# Patient Record
Sex: FEMALE | Race: WHITE | Employment: PART TIME | URBAN - METROPOLITAN AREA
[De-identification: names, ages, dates, MRNs, and addresses within clinical notes are randomized per-mention and may not be internally consistent; named-entity substitution may affect disease eponyms.]

---

## 2019-11-29 ENCOUNTER — APPOINTMENT (OUTPATIENT)
Dept: GENERAL RADIOLOGY | Facility: CLINIC | Age: 34
End: 2019-11-29
Attending: EMERGENCY MEDICINE
Payer: COMMERCIAL

## 2019-11-29 ENCOUNTER — HOSPITAL ENCOUNTER (EMERGENCY)
Facility: CLINIC | Age: 34
Discharge: HOME | End: 2019-11-29
Attending: EMERGENCY MEDICINE | Admitting: EMERGENCY MEDICINE
Payer: COMMERCIAL

## 2019-11-29 VITALS
SYSTOLIC BLOOD PRESSURE: 119 MMHG | TEMPERATURE: 98 F | OXYGEN SATURATION: 99 % | RESPIRATION RATE: 18 BRPM | DIASTOLIC BLOOD PRESSURE: 63 MMHG | HEART RATE: 80 BPM

## 2019-11-29 DIAGNOSIS — R05.9 COUGH: ICD-10-CM

## 2019-11-29 DIAGNOSIS — R07.89 CHEST WALL PAIN: ICD-10-CM

## 2019-11-29 PROCEDURE — 99284 EMERGENCY DEPT VISIT MOD MDM: CPT | Mod: 25

## 2019-11-29 PROCEDURE — 71046 X-RAY EXAM CHEST 2 VIEWS: CPT

## 2019-11-29 RX ORDER — GUAIFENESIN 200 MG/10ML
200 LIQUID ORAL EVERY 4 HOURS PRN
Qty: 118 ML | Refills: 0 | Status: SHIPPED | OUTPATIENT
Start: 2019-11-29

## 2019-11-29 ASSESSMENT — ENCOUNTER SYMPTOMS
COUGH: 1
FEVER: 1
SORE THROAT: 1

## 2019-11-29 NOTE — ED PROVIDER NOTES
History     Chief Complaint:    Cough and Chest Wall Pain      HPI   Mirna Horne is a 34 year old female who presents to the ED for evaluation of cough and chest pain. The patient states that she developed a fever four days ago. Yesterday, her fever persisted, though was improving, however, she also developed a cough. She states that she has throat pain that radiates to her chest when she coughs as well. This morning, she states that her fever had resolved but her cough and chest pain persisted which prompted her visti to the ED. She notes that her kids have been sick with influenza.     Allergies:  The patient has no known drug allergies.    Medications:    The patient is currently on no regular medications.    Past Medical History:    ASCUS of cervix with negative HPV    Past Surgical History:    Cryotherapy of cervix    Family History:    No past pertinent family history.    Social History:  Negative for tobacco use.  Negative for alcohol use.  Presents with significant other.   Children are sick.     Review of Systems   Constitutional: Positive for fever.   HENT: Positive for sore throat.    Respiratory: Positive for cough.    All other systems reviewed and are negative.    Physical Exam   First Vitals:  BP: 127/77  Pulse: 87  Temp: 97.4  F (36.3  C)  Resp: 16  SpO2: 100 %      Physical Exam  General: Patient is alert and interactive when I enter the room  Head:  The scalp, face, and head appear normal  Eyes:  Conjunctivae are normal  ENT:    The nose is normal    Pinnae are normal    External acoustic canals are normal  Neck:  Trachea midline  CV:  Pulses are normal,RRR.    Resp:  No respiratory distress, CTAB, no wheezing     Abdomen:      Soft, non-tender, non-distended  Musc:  Normal muscular tone    No major joint effusions    No asymmetric leg swelling  Skin:  No rash or lesions noted  Neuro:  Speech is normal and fluent. Face is symmetric.     Moving all extremities well.   Psych: Awake. Alert.   Normal affect.  Appropriate interactions.  Emergency Department Course   Imaging:  Radiographic findings were communicated with the patient who voiced understanding of the findings.  XR Chest 2 views:   IMPRESSION: Heart size is normal. No pleural effusion, pneumothorax,  or abnormal area of consolidation as per radiology.    Emergency Department Course:  Nursing notes and vitals reviewed. (0715) I performed an exam of the patient as documented above.     The patient was sent for a chest x-ray while in the emergency department, findings above.     0750 I rechecked the patient and discussed the results of her workup thus far.     Findings and plan explained to the Patient. Patient discharged home with instructions regarding supportive care, medications, and reasons to return. The importance of close follow-up was reviewed. The patient was prescribed Robitussin.     Impression & Plan    Medical Decision Making:  Mirna Horne is a 34 year old female who presented to the ER for evaluation of a cough.  VS on presentation reveal normal vitals.  Differential diagnosis includes, though is not limited to, acute bronchitis, pneumonia, sinusitis, pharyngitis, reactive airway disease (asthma/COPD), CHF, GERD, influenza, pertussis, aspiration, occupational exposure, medication side effect, among others.  History, examination, and ED course are most consistent with bronchitis.  Associated symptoms with her current illness include chest wall pain.  CXR is negative for infiltrate suggesting pneumonia.  Pulmonary exam does not reveal wheezing, prolonged expiratory phase, nor evidence of increased work of breathing suggestive of reactive airway disease.  No history of aspiration.   She is not immunocompromised which would complicate their current clinical course.  Clinical impression was discussed with the patient.  Discussed that initial symptoms may improve during the first 3-5 days, although the cough may persist for 1-2 weeks.   Symptomatic cares including rest, fluids, antipyretics, analgesia, and a trial of cough suppressant medication was recommended.   Pt was recommended to return to the ER with shortness of breath, development of chest pain, become unable to tolerate PO, develop any other new or troubling symptoms.  Otherwise, patient is recommended to follow-up with primary care provider in 1 week for re-evaluation.  All questions were answered prior to discharge.  She felt comfortable with this plan of care.      Diagnosis:    ICD-10-CM    1. Cough R05    2. Chest wall pain R07.89        Disposition:  discharged to home    Discharge Medications:  New Prescriptions    GUAIFENESIN (ROBITUSSIN) 100 MG/5ML LIQUID    Take 10 mLs (200 mg) by mouth every 4 hours as needed for cough     Scribe Disclosure:  I,  Jose Agarwal, am serving as a scribe on 11/29/2019 at 7:14 AM to personally document services performed by Jen Brunson MD based on my observations and the provider's statements to me.        Jose Agarwal  11/29/2019   [facility] EMERGENCY DEPARTMENT       Jen Brunson MD  12/01/19 0653

## 2019-11-29 NOTE — ED AVS SNAPSHOT
[facility] Emergency Department  [address]  [facility] [address]  Phone:  [phone]  Fax:  [phone]                                    Mirna Horne   MRN: [id number]    Department:  [facility] Emergency Department   Date of Visit:  11/29/2019           After Visit Summary Signature Page    I have received my discharge instructions, and my questions have been answered. I have discussed any challenges I see with this plan with the nurse or doctor.    ..........................................................................................................................................  Patient/Patient Representative Signature      ..........................................................................................................................................  Patient Representative Print Name and Relationship to Patient    ..................................................               ................................................  Date                                   Time    ..........................................................................................................................................  Reviewed by Signature/Title    ...................................................              ..............................................  Date                                               Time          22EPIC Rev 08/18

## 2019-11-29 NOTE — ED TRIAGE NOTES
Patient presents to the ED with a cough and chest wall pain. Cough began Monday. On Tuesday developed pain with coughing and deep inspiration that has gotten progressively worse.